# Patient Record
Sex: FEMALE | Race: BLACK OR AFRICAN AMERICAN
[De-identification: names, ages, dates, MRNs, and addresses within clinical notes are randomized per-mention and may not be internally consistent; named-entity substitution may affect disease eponyms.]

---

## 2019-03-05 ENCOUNTER — HOSPITAL ENCOUNTER (INPATIENT)
Dept: HOSPITAL 91 - REC | Age: 54
LOS: 3 days | Discharge: HOME | DRG: 743 | End: 2019-03-08
Payer: COMMERCIAL

## 2019-03-05 ENCOUNTER — HOSPITAL ENCOUNTER (INPATIENT)
Dept: HOSPITAL 10 - REC | Age: 54
LOS: 3 days | Discharge: HOME | DRG: 743 | End: 2019-03-08
Attending: OBSTETRICS & GYNECOLOGY | Admitting: OBSTETRICS & GYNECOLOGY
Payer: COMMERCIAL

## 2019-03-05 VITALS — SYSTOLIC BLOOD PRESSURE: 128 MMHG | DIASTOLIC BLOOD PRESSURE: 67 MMHG | HEART RATE: 70 BPM | RESPIRATION RATE: 11 BRPM

## 2019-03-05 VITALS — RESPIRATION RATE: 16 BRPM | DIASTOLIC BLOOD PRESSURE: 69 MMHG | HEART RATE: 89 BPM | SYSTOLIC BLOOD PRESSURE: 124 MMHG

## 2019-03-05 VITALS — SYSTOLIC BLOOD PRESSURE: 123 MMHG | RESPIRATION RATE: 12 BRPM | HEART RATE: 72 BPM | DIASTOLIC BLOOD PRESSURE: 64 MMHG

## 2019-03-05 VITALS — RESPIRATION RATE: 14 BRPM | SYSTOLIC BLOOD PRESSURE: 128 MMHG | DIASTOLIC BLOOD PRESSURE: 66 MMHG | HEART RATE: 72 BPM

## 2019-03-05 VITALS — SYSTOLIC BLOOD PRESSURE: 122 MMHG | DIASTOLIC BLOOD PRESSURE: 64 MMHG | HEART RATE: 77 BPM

## 2019-03-05 VITALS — DIASTOLIC BLOOD PRESSURE: 72 MMHG | SYSTOLIC BLOOD PRESSURE: 122 MMHG | HEART RATE: 68 BPM

## 2019-03-05 VITALS — HEART RATE: 78 BPM | DIASTOLIC BLOOD PRESSURE: 74 MMHG | SYSTOLIC BLOOD PRESSURE: 127 MMHG

## 2019-03-05 VITALS — HEART RATE: 70 BPM | SYSTOLIC BLOOD PRESSURE: 132 MMHG | DIASTOLIC BLOOD PRESSURE: 66 MMHG | RESPIRATION RATE: 12 BRPM

## 2019-03-05 VITALS — SYSTOLIC BLOOD PRESSURE: 121 MMHG | RESPIRATION RATE: 20 BRPM | HEART RATE: 74 BPM | DIASTOLIC BLOOD PRESSURE: 60 MMHG

## 2019-03-05 VITALS — DIASTOLIC BLOOD PRESSURE: 80 MMHG | HEART RATE: 82 BPM | SYSTOLIC BLOOD PRESSURE: 136 MMHG

## 2019-03-05 VITALS — SYSTOLIC BLOOD PRESSURE: 137 MMHG | RESPIRATION RATE: 18 BRPM | HEART RATE: 76 BPM | DIASTOLIC BLOOD PRESSURE: 78 MMHG

## 2019-03-05 VITALS — HEART RATE: 72 BPM | SYSTOLIC BLOOD PRESSURE: 127 MMHG | RESPIRATION RATE: 14 BRPM | DIASTOLIC BLOOD PRESSURE: 63 MMHG

## 2019-03-05 VITALS — DIASTOLIC BLOOD PRESSURE: 70 MMHG | HEART RATE: 64 BPM | SYSTOLIC BLOOD PRESSURE: 127 MMHG

## 2019-03-05 VITALS — SYSTOLIC BLOOD PRESSURE: 119 MMHG | HEART RATE: 74 BPM | DIASTOLIC BLOOD PRESSURE: 61 MMHG | RESPIRATION RATE: 13 BRPM

## 2019-03-05 VITALS
BODY MASS INDEX: 24.01 KG/M2 | BODY MASS INDEX: 24.01 KG/M2 | HEIGHT: 64 IN | WEIGHT: 140.65 LBS | BODY MASS INDEX: 24.01 KG/M2 | WEIGHT: 140.65 LBS | HEIGHT: 64 IN

## 2019-03-05 VITALS — RESPIRATION RATE: 18 BRPM | DIASTOLIC BLOOD PRESSURE: 81 MMHG | SYSTOLIC BLOOD PRESSURE: 140 MMHG | HEART RATE: 81 BPM

## 2019-03-05 VITALS — HEART RATE: 74 BPM | DIASTOLIC BLOOD PRESSURE: 69 MMHG | RESPIRATION RATE: 12 BRPM | SYSTOLIC BLOOD PRESSURE: 127 MMHG

## 2019-03-05 VITALS — RESPIRATION RATE: 16 BRPM | SYSTOLIC BLOOD PRESSURE: 120 MMHG | HEART RATE: 82 BPM | DIASTOLIC BLOOD PRESSURE: 60 MMHG

## 2019-03-05 VITALS — DIASTOLIC BLOOD PRESSURE: 65 MMHG | SYSTOLIC BLOOD PRESSURE: 126 MMHG | HEART RATE: 72 BPM | RESPIRATION RATE: 13 BRPM

## 2019-03-05 VITALS — DIASTOLIC BLOOD PRESSURE: 61 MMHG | RESPIRATION RATE: 13 BRPM | SYSTOLIC BLOOD PRESSURE: 122 MMHG | HEART RATE: 78 BPM

## 2019-03-05 VITALS — HEART RATE: 74 BPM | RESPIRATION RATE: 14 BRPM | SYSTOLIC BLOOD PRESSURE: 119 MMHG | DIASTOLIC BLOOD PRESSURE: 61 MMHG

## 2019-03-05 VITALS — DIASTOLIC BLOOD PRESSURE: 62 MMHG | RESPIRATION RATE: 19 BRPM | SYSTOLIC BLOOD PRESSURE: 121 MMHG | HEART RATE: 72 BPM

## 2019-03-05 VITALS — DIASTOLIC BLOOD PRESSURE: 77 MMHG | SYSTOLIC BLOOD PRESSURE: 126 MMHG | HEART RATE: 68 BPM

## 2019-03-05 VITALS — SYSTOLIC BLOOD PRESSURE: 121 MMHG | HEART RATE: 72 BPM | DIASTOLIC BLOOD PRESSURE: 68 MMHG

## 2019-03-05 VITALS — SYSTOLIC BLOOD PRESSURE: 126 MMHG | HEART RATE: 74 BPM | RESPIRATION RATE: 12 BRPM | DIASTOLIC BLOOD PRESSURE: 65 MMHG

## 2019-03-05 VITALS — HEART RATE: 88 BPM | SYSTOLIC BLOOD PRESSURE: 122 MMHG | RESPIRATION RATE: 17 BRPM | DIASTOLIC BLOOD PRESSURE: 54 MMHG

## 2019-03-05 VITALS — HEART RATE: 75 BPM | SYSTOLIC BLOOD PRESSURE: 132 MMHG | DIASTOLIC BLOOD PRESSURE: 72 MMHG

## 2019-03-05 VITALS — RESPIRATION RATE: 14 BRPM | SYSTOLIC BLOOD PRESSURE: 121 MMHG | HEART RATE: 74 BPM | DIASTOLIC BLOOD PRESSURE: 61 MMHG

## 2019-03-05 DIAGNOSIS — I25.119: ICD-10-CM

## 2019-03-05 DIAGNOSIS — N73.6: ICD-10-CM

## 2019-03-05 DIAGNOSIS — F41.9: ICD-10-CM

## 2019-03-05 DIAGNOSIS — D25.1: Primary | ICD-10-CM

## 2019-03-05 DIAGNOSIS — I10: ICD-10-CM

## 2019-03-05 DIAGNOSIS — D25.2: ICD-10-CM

## 2019-03-05 DIAGNOSIS — F32.9: ICD-10-CM

## 2019-03-05 PROCEDURE — 0UT90ZL RESECTION OF UTERUS, SUPRACERVICAL, OPEN APPROACH: ICD-10-PCS | Performed by: OBSTETRICS & GYNECOLOGY

## 2019-03-05 PROCEDURE — 0UN90ZZ RELEASE UTERUS, OPEN APPROACH: ICD-10-PCS | Performed by: OBSTETRICS & GYNECOLOGY

## 2019-03-05 PROCEDURE — 0UN20ZZ RELEASE BILATERAL OVARIES, OPEN APPROACH: ICD-10-PCS | Performed by: OBSTETRICS & GYNECOLOGY

## 2019-03-05 PROCEDURE — 87086 URINE CULTURE/COLONY COUNT: CPT

## 2019-03-05 PROCEDURE — 0UT70ZZ RESECTION OF BILATERAL FALLOPIAN TUBES, OPEN APPROACH: ICD-10-PCS | Performed by: OBSTETRICS & GYNECOLOGY

## 2019-03-05 PROCEDURE — 80053 COMPREHEN METABOLIC PANEL: CPT

## 2019-03-05 PROCEDURE — 0UT20ZZ RESECTION OF BILATERAL OVARIES, OPEN APPROACH: ICD-10-PCS | Performed by: OBSTETRICS & GYNECOLOGY

## 2019-03-05 PROCEDURE — 86850 RBC ANTIBODY SCREEN: CPT

## 2019-03-05 PROCEDURE — 88305 TISSUE EXAM BY PATHOLOGIST: CPT

## 2019-03-05 PROCEDURE — 85025 COMPLETE CBC W/AUTO DIFF WBC: CPT

## 2019-03-05 PROCEDURE — 0UN90ZZ RELEASE UTERUS, OPEN APPROACH: ICD-10-PCS

## 2019-03-05 PROCEDURE — 81001 URINALYSIS AUTO W/SCOPE: CPT

## 2019-03-05 PROCEDURE — 86920 COMPATIBILITY TEST SPIN: CPT

## 2019-03-05 PROCEDURE — 0UT90ZL RESECTION OF UTERUS, SUPRACERVICAL, OPEN APPROACH: ICD-10-PCS

## 2019-03-05 PROCEDURE — 86901 BLOOD TYPING SEROLOGIC RH(D): CPT

## 2019-03-05 PROCEDURE — 86900 BLOOD TYPING SEROLOGIC ABO: CPT

## 2019-03-05 PROCEDURE — 0UT70ZZ RESECTION OF BILATERAL FALLOPIAN TUBES, OPEN APPROACH: ICD-10-PCS

## 2019-03-05 PROCEDURE — 0UT20ZZ RESECTION OF BILATERAL OVARIES, OPEN APPROACH: ICD-10-PCS

## 2019-03-05 PROCEDURE — 0UN70ZZ RELEASE BILATERAL FALLOPIAN TUBES, OPEN APPROACH: ICD-10-PCS

## 2019-03-05 PROCEDURE — 0UN70ZZ RELEASE BILATERAL FALLOPIAN TUBES, OPEN APPROACH: ICD-10-PCS | Performed by: OBSTETRICS & GYNECOLOGY

## 2019-03-05 PROCEDURE — 0UN20ZZ RELEASE BILATERAL OVARIES, OPEN APPROACH: ICD-10-PCS

## 2019-03-05 RX ADMIN — PYRIDOXINE HYDROCHLORIDE SCH MLS/HR: 100 INJECTION, SOLUTION INTRAMUSCULAR; INTRAVENOUS at 11:32

## 2019-03-05 RX ADMIN — PYRIDOXINE HYDROCHLORIDE 1 MLS/HR: 100 INJECTION, SOLUTION INTRAMUSCULAR; INTRAVENOUS at 15:00

## 2019-03-05 RX ADMIN — ONDANSETRON HYDROCHLORIDE 1 MG: 2 INJECTION, SOLUTION INTRAMUSCULAR; INTRAVENOUS at 20:47

## 2019-03-05 RX ADMIN — KETOROLAC TROMETHAMINE PRN MG: 30 INJECTION, SOLUTION INTRAMUSCULAR at 20:47

## 2019-03-05 RX ADMIN — KETOROLAC TROMETHAMINE PRN MG: 30 INJECTION, SOLUTION INTRAMUSCULAR at 11:36

## 2019-03-05 RX ADMIN — KETOROLAC TROMETHAMINE 1 MG: 30 INJECTION, SOLUTION INTRAMUSCULAR at 20:47

## 2019-03-05 RX ADMIN — PYRIDOXINE HYDROCHLORIDE 1 MLS/HR: 100 INJECTION, SOLUTION INTRAMUSCULAR; INTRAVENOUS at 11:32

## 2019-03-05 RX ADMIN — CEFAZOLIN SODIUM 1 MLS/HR: 2 SOLUTION INTRAVENOUS at 12:42

## 2019-03-05 RX ADMIN — KETOROLAC TROMETHAMINE 1 MG: 30 INJECTION, SOLUTION INTRAMUSCULAR at 11:36

## 2019-03-05 RX ADMIN — DEXAMETHASONE SODIUM PHOSPHATE PRN MG: 10 INJECTION, SOLUTION INTRAMUSCULAR; INTRAVENOUS at 20:47

## 2019-03-05 RX ADMIN — PYRIDOXINE HYDROCHLORIDE SCH MLS/HR: 100 INJECTION, SOLUTION INTRAMUSCULAR; INTRAVENOUS at 15:00

## 2019-03-05 RX ADMIN — MORPHINE SULFATE 1 MG: 2 INJECTION, SOLUTION INTRAMUSCULAR; INTRAVENOUS at 18:40

## 2019-03-05 RX ADMIN — DEXAMETHASONE SODIUM PHOSPHATE PRN MG: 10 INJECTION, SOLUTION INTRAMUSCULAR; INTRAVENOUS at 12:32

## 2019-03-05 RX ADMIN — ONDANSETRON HYDROCHLORIDE 1 MG: 2 INJECTION, SOLUTION INTRAMUSCULAR; INTRAVENOUS at 12:32

## 2019-03-05 NOTE — PREAC
Date/Time of Note


Date/Time of Note


DATE: 3/5/19 


TIME: 07:37





Anesthesia Eval and Record


Evaluation


Time Pre-Procedure Interview


DATE: 3/5/19 


TIME: 07:37


Age


53


Sex


female


NPO:  8 hrs


Preoperative diagnosis


uterine fibroids


Planned procedure


abdominal suracervical hysterectomy





Past Medical History


Past Medical History:  Includes


Cardio:  HTN, CAD (angina)


Psych:  Depression, Anxiety





Surgery & Anesthesia Issues


No known issue





Meds


Anticoagulation:  No


Beta Blocker within 24 hr:  No


Reason Beta Blocker not given:  Pt. not on B-Blocker


Reported Medications


Amlodipine Besylate* (Norvasc*) 2.5 Mg Tablet, 2.5 MG PO DAILY


   1/5/15





Current Medications


Lactated Ringer's 1,000 ml @  125 mls/hr Q8H IV* ;  Start 3/5/19 at 07:00


Meds reviewed:  Yes





Allergies


Coded Allergies:  


     guaifenesin (Verified  Allergy, Intermediate, FACIAL SWELLING, 3/5/19)


     doxycycline (Verified  Allergy, Mild, RASH, 3/5/19)


Allergies Reviewed:  Yes





Labs/Studies


Labs Reviewed:  Reviewed by anesthesiologist





Blood Bank


                      Test
                  3/5/19
06:02


                      Blood Product Summary  Counts


                      Blood Type             B POSITIVE





Pregnancy test:  Negative


Studies:  ECG (nl), CXR (nl)





Pre-procedure Exam


Last vitals





Vital Signs


  Date      Temp  Pulse  Resp  B/P (MAP)   Pulse Ox  O2          O2 Flow    FiO2


Time                                                 Delivery    Rate


    3/5/19  98.8     76    18      137/78       100  Room Air


     06:39                           (97)





Airway:  Adequate mouth opening, Adequate thyromental dist


Mallampati:  Mallampati II


Teeth:  Normal


Lung:  Normal


Heart:  Normal





ASA Physical Status


ASA physical status:  3


Emergency:  None





Planned Anesthetic


General/MAC:  ETT


Neuraxial:  Spinal





Planned Pain Management


Sub-arachniod narcotics, Parenteral pain med





Pre-operative Attestations


Prior to commencing anesthesia and surgery, the patient was re-evaluated, there 


was verification of:


*The patient's identity


*The results of appropriate recent lab work and preoperative vital signs


*The above evaluation not changing prior to induction


*Anesthetic plan, risk benefits, alternative and complications discussed with 


patient/family; questions answered; patient/family understands, accepts and 


wishes to proceed.











Fredis Miller M.D.       Mar 5, 2019 07:39

## 2019-03-05 NOTE — PAC
Date/Time of Note


Date/Time of Note


DATE: 3/5/19 


TIME: 10:57





Post-Anesthesia Notes


Post-Anesthesia Note


Last documented vital signs





Vital Signs


  Date      Temp  Pulse  Resp  B/P (MAP)   Pulse Ox  O2          O2 Flow    FiO2


Time                                                 Delivery    Rate


    3/5/19           72    14      127/63        97  Room Air


     10:41                           (84)


    3/5/19  98.2


     10:08





Activity:  WNL


Respiratory function:  WNL


Cardiovascular function:  WNL


Mental status:  Baseline


Pain reasonably controlled:  Yes


Hydration appropriate:  Yes


Nausea/Vomiting absent:  Yes











Fredis Miller M.D.       Mar 5, 2019 10:57

## 2019-03-05 NOTE — HPN
Date/Time of Note


Date/Time of Note


DATE: 3/5/19 


TIME: 07:17





Interval H&P Admission Note


Pt. seen H&P reviewed:  No system changes











STEWART AVILA MD                 Mar 5, 2019 07:17

## 2019-03-05 NOTE — SIPON
Date/Time of Note


Date/Time of Note


DATE: 3/5/19 


TIME: 09:55





Operative Report


Preoperative Diagnosis


uterine leiomyomata


hx of multiple myomectomy


Postoperative Diagnosis


same as above


severe pelvic adhesion


Operation/Procedure Performed


lysis of adhesion


subtotal hysterectomy


bilateral salphingo oophorectomy


Surgeon


see signature line


First assist


reiche


Anesthesia:  general, spinal


Estimated blood loss:  100 - 150 ml's


Transfusion Required


   none


Specimen


uterus


both  ovaries and tubes


Grafts/Implants


none


Complications


none











STEWART AVILA MD                 Mar 5, 2019 09:59

## 2019-03-06 VITALS — DIASTOLIC BLOOD PRESSURE: 72 MMHG | RESPIRATION RATE: 17 BRPM | SYSTOLIC BLOOD PRESSURE: 133 MMHG | HEART RATE: 77 BPM

## 2019-03-06 VITALS — RESPIRATION RATE: 18 BRPM | HEART RATE: 75 BPM | DIASTOLIC BLOOD PRESSURE: 66 MMHG | SYSTOLIC BLOOD PRESSURE: 142 MMHG

## 2019-03-06 VITALS — SYSTOLIC BLOOD PRESSURE: 123 MMHG | HEART RATE: 81 BPM | DIASTOLIC BLOOD PRESSURE: 70 MMHG | RESPIRATION RATE: 18 BRPM

## 2019-03-06 VITALS — SYSTOLIC BLOOD PRESSURE: 153 MMHG | HEART RATE: 74 BPM | RESPIRATION RATE: 18 BRPM | DIASTOLIC BLOOD PRESSURE: 79 MMHG

## 2019-03-06 VITALS — DIASTOLIC BLOOD PRESSURE: 79 MMHG | SYSTOLIC BLOOD PRESSURE: 142 MMHG | RESPIRATION RATE: 20 BRPM | HEART RATE: 74 BPM

## 2019-03-06 LAB
ADD MAN DIFF?: NO
ALANINE AMINOTRANSFERASE: 8 IU/L (ref 13–69)
ALBUMIN/GLOBULIN RATIO: 1.06
ALBUMIN: 3.5 G/DL (ref 3.3–4.9)
ALKALINE PHOSPHATASE: 50 IU/L (ref 42–121)
ANION GAP: 6 (ref 5–13)
ASPARTATE AMINO TRANSFERASE: 20 IU/L (ref 15–46)
BASOPHIL #: 0 10^3/UL (ref 0–0.1)
BASOPHILS %: 0.2 % (ref 0–2)
BILIRUBIN,DIRECT: 0 MG/DL (ref 0–0.2)
BILIRUBIN,TOTAL: 0.7 MG/DL (ref 0.2–1.3)
BLOOD UREA NITROGEN: 8 MG/DL (ref 7–20)
CALCIUM: 8.7 MG/DL (ref 8.4–10.2)
CARBON DIOXIDE: 28 MMOL/L (ref 21–31)
CHLORIDE: 105 MMOL/L (ref 97–110)
CREATININE: 0.81 MG/DL (ref 0.44–1)
EOSINOPHILS #: 0 10^3/UL (ref 0–0.5)
EOSINOPHILS %: 0 % (ref 0–7)
GLOBULIN: 3.3 G/DL (ref 1.3–3.2)
GLUCOSE: 116 MG/DL (ref 70–220)
HEMATOCRIT: 32.3 % (ref 37–47)
HEMOGLOBIN: 10.8 G/DL (ref 12–16)
IMMATURE GRANS #M: 0.07 10^3/UL (ref 0–0.03)
IMMATURE GRANS % (M): 0.6 % (ref 0–0.43)
LYMPHOCYTES #: 1 10^3/UL (ref 0.8–2.9)
LYMPHOCYTES %: 8.9 % (ref 15–51)
MEAN CORPUSCULAR HEMOGLOBIN: 26.5 PG (ref 29–33)
MEAN CORPUSCULAR HGB CONC: 33.4 G/DL (ref 32–37)
MEAN CORPUSCULAR VOLUME: 79.2 FL (ref 82–101)
MEAN PLATELET VOLUME: 11.7 FL (ref 7.4–10.4)
MONOCYTE #: 1.1 10^3/UL (ref 0.3–0.9)
MONOCYTES %: 9.5 % (ref 0–11)
NEUTROPHIL #: 9.3 10^3/UL (ref 1.6–7.5)
NEUTROPHILS %: 80.8 % (ref 39–77)
NUCLEATED RED BLOOD CELLS #: 0 10^3/UL (ref 0–0)
NUCLEATED RED BLOOD CELLS%: 0 /100WBC (ref 0–0)
PLATELET COUNT: 200 10^3/UL (ref 140–415)
POTASSIUM: 3.9 MMOL/L (ref 3.5–5.1)
RED BLOOD COUNT: 4.08 10^6/UL (ref 4.2–5.4)
RED CELL DISTRIBUTION WIDTH: 14.6 % (ref 11.5–14.5)
SODIUM: 139 MMOL/L (ref 135–144)
TOTAL PROTEIN: 6.8 G/DL (ref 6.1–8.1)
WHITE BLOOD COUNT: 11.5 10^3/UL (ref 4.8–10.8)

## 2019-03-06 RX ADMIN — ONDANSETRON HYDROCHLORIDE 1 MG: 2 INJECTION, SOLUTION INTRAMUSCULAR; INTRAVENOUS at 07:33

## 2019-03-06 RX ADMIN — PYRIDOXINE HYDROCHLORIDE SCH MLS/HR: 100 INJECTION, SOLUTION INTRAMUSCULAR; INTRAVENOUS at 02:41

## 2019-03-06 RX ADMIN — HYDROCODONE BITARTRATE AND ACETAMINOPHEN 1 TAB: 5; 325 TABLET ORAL at 15:50

## 2019-03-06 RX ADMIN — PYRIDOXINE HYDROCHLORIDE 1 MLS/HR: 100 INJECTION, SOLUTION INTRAMUSCULAR; INTRAVENOUS at 02:41

## 2019-03-06 RX ADMIN — PYRIDOXINE HYDROCHLORIDE 1 MLS/HR: 100 INJECTION, SOLUTION INTRAMUSCULAR; INTRAVENOUS at 07:00

## 2019-03-06 RX ADMIN — IBUPROFEN 1 MG: 800 TABLET, FILM COATED ORAL at 14:07

## 2019-03-06 RX ADMIN — PYRIDOXINE HYDROCHLORIDE SCH MLS/HR: 100 INJECTION, SOLUTION INTRAMUSCULAR; INTRAVENOUS at 07:00

## 2019-03-06 RX ADMIN — PYRIDOXINE HYDROCHLORIDE SCH MLS/HR: 100 INJECTION, SOLUTION INTRAMUSCULAR; INTRAVENOUS at 11:34

## 2019-03-06 RX ADMIN — IBUPROFEN SCH MG: 800 TABLET ORAL at 22:00

## 2019-03-06 RX ADMIN — HYDROCODONE BITARTRATE AND ACETAMINOPHEN PRN TAB: 5; 325 TABLET ORAL at 15:50

## 2019-03-06 RX ADMIN — HYDROCODONE BITARTRATE AND ACETAMINOPHEN PRN TAB: 5; 325 TABLET ORAL at 19:42

## 2019-03-06 RX ADMIN — IBUPROFEN 1 MG: 800 TABLET, FILM COATED ORAL at 22:00

## 2019-03-06 RX ADMIN — MORPHINE SULFATE 1 MG: 2 INJECTION, SOLUTION INTRAMUSCULAR; INTRAVENOUS at 07:33

## 2019-03-06 RX ADMIN — DEXAMETHASONE SODIUM PHOSPHATE PRN MG: 10 INJECTION, SOLUTION INTRAMUSCULAR; INTRAVENOUS at 07:33

## 2019-03-06 RX ADMIN — PYRIDOXINE HYDROCHLORIDE 1 MLS/HR: 100 INJECTION, SOLUTION INTRAMUSCULAR; INTRAVENOUS at 11:34

## 2019-03-06 RX ADMIN — MORPHINE SULFATE 1 MG: 2 INJECTION, SOLUTION INTRAMUSCULAR; INTRAVENOUS at 00:23

## 2019-03-06 RX ADMIN — MORPHINE SULFATE PRN MG: 2 INJECTION, SOLUTION INTRAMUSCULAR; INTRAVENOUS at 07:33

## 2019-03-06 RX ADMIN — HYDROCODONE BITARTRATE AND ACETAMINOPHEN 1 TAB: 5; 325 TABLET ORAL at 19:42

## 2019-03-06 RX ADMIN — PYRIDOXINE HYDROCHLORIDE 1 MLS/HR: 100 INJECTION, SOLUTION INTRAMUSCULAR; INTRAVENOUS at 18:53

## 2019-03-06 RX ADMIN — PYRIDOXINE HYDROCHLORIDE SCH MLS/HR: 100 INJECTION, SOLUTION INTRAMUSCULAR; INTRAVENOUS at 18:53

## 2019-03-06 RX ADMIN — MORPHINE SULFATE PRN MG: 2 INJECTION, SOLUTION INTRAMUSCULAR; INTRAVENOUS at 00:23

## 2019-03-06 RX ADMIN — IBUPROFEN SCH MG: 800 TABLET ORAL at 14:07

## 2019-03-06 NOTE — QN
Documentation


Comment


no flatus


not ambulating


max temp 99.5 normotensive


abdomen soft  wound   dry


calf neg for tenderness


A   strable  POD #1


P as ordered











STEWART AVILA MD                 Mar 6, 2019 20:42

## 2019-03-07 VITALS — HEART RATE: 64 BPM | SYSTOLIC BLOOD PRESSURE: 119 MMHG | DIASTOLIC BLOOD PRESSURE: 74 MMHG | RESPIRATION RATE: 18 BRPM

## 2019-03-07 VITALS — HEART RATE: 64 BPM | RESPIRATION RATE: 17 BRPM | DIASTOLIC BLOOD PRESSURE: 81 MMHG | SYSTOLIC BLOOD PRESSURE: 140 MMHG

## 2019-03-07 VITALS — HEART RATE: 61 BPM | SYSTOLIC BLOOD PRESSURE: 128 MMHG | RESPIRATION RATE: 20 BRPM | DIASTOLIC BLOOD PRESSURE: 65 MMHG

## 2019-03-07 VITALS — DIASTOLIC BLOOD PRESSURE: 94 MMHG | HEART RATE: 66 BPM | SYSTOLIC BLOOD PRESSURE: 164 MMHG | RESPIRATION RATE: 20 BRPM

## 2019-03-07 LAB
ADD UMIC: YES
UR ASCORBIC ACID: NEGATIVE MG/DL
UR BILIRUBIN (DIP): NEGATIVE MG/DL
UR BLOOD (DIP): NEGATIVE MG/DL
UR CLARITY: CLEAR
UR COLOR: (no result)
UR GLUCOSE (DIP): NEGATIVE MG/DL
UR KETONES (DIP): (no result) MG/DL
UR LEUKOCYTE ESTERASE (DIP): (no result) LEU/UL
UR NITRITE (DIP): NEGATIVE MG/DL
UR PH (DIP): 8 (ref 5–9)
UR RBC: 1 /HPF (ref 0–5)
UR SPECIFIC GRAVITY (DIP): 1 (ref 1–1.03)
UR TOTAL PROTEIN (DIP): NEGATIVE MG/DL
UR UROBILINOGEN (DIP): NEGATIVE MG/DL
UR WBC: 1 /HPF (ref 0–5)

## 2019-03-07 RX ADMIN — IBUPROFEN SCH MG: 800 TABLET ORAL at 13:58

## 2019-03-07 RX ADMIN — IBUPROFEN 1 MG: 800 TABLET, FILM COATED ORAL at 06:12

## 2019-03-07 RX ADMIN — IBUPROFEN SCH MG: 800 TABLET ORAL at 22:04

## 2019-03-07 RX ADMIN — HYDROCODONE BITARTRATE AND ACETAMINOPHEN PRN TAB: 5; 325 TABLET ORAL at 05:25

## 2019-03-07 RX ADMIN — HYDROCODONE BITARTRATE AND ACETAMINOPHEN 1 TAB: 5; 325 TABLET ORAL at 09:34

## 2019-03-07 RX ADMIN — PYRIDOXINE HYDROCHLORIDE SCH MLS/HR: 100 INJECTION, SOLUTION INTRAMUSCULAR; INTRAVENOUS at 13:59

## 2019-03-07 RX ADMIN — HYDROCODONE BITARTRATE AND ACETAMINOPHEN 1 TAB: 5; 325 TABLET ORAL at 19:09

## 2019-03-07 RX ADMIN — HYDROCODONE BITARTRATE AND ACETAMINOPHEN PRN TAB: 5; 325 TABLET ORAL at 19:09

## 2019-03-07 RX ADMIN — PYRIDOXINE HYDROCHLORIDE 1 MLS/HR: 100 INJECTION, SOLUTION INTRAMUSCULAR; INTRAVENOUS at 22:05

## 2019-03-07 RX ADMIN — HYDROCODONE BITARTRATE AND ACETAMINOPHEN PRN TAB: 5; 325 TABLET ORAL at 09:34

## 2019-03-07 RX ADMIN — IBUPROFEN 1 MG: 800 TABLET, FILM COATED ORAL at 22:04

## 2019-03-07 RX ADMIN — PYRIDOXINE HYDROCHLORIDE SCH MLS/HR: 100 INJECTION, SOLUTION INTRAMUSCULAR; INTRAVENOUS at 22:05

## 2019-03-07 RX ADMIN — HYDROCODONE BITARTRATE AND ACETAMINOPHEN 1 TAB: 5; 325 TABLET ORAL at 05:25

## 2019-03-07 RX ADMIN — PYRIDOXINE HYDROCHLORIDE 1 MLS/HR: 100 INJECTION, SOLUTION INTRAMUSCULAR; INTRAVENOUS at 13:59

## 2019-03-07 RX ADMIN — PYRIDOXINE HYDROCHLORIDE 1 MLS/HR: 100 INJECTION, SOLUTION INTRAMUSCULAR; INTRAVENOUS at 03:41

## 2019-03-07 RX ADMIN — IBUPROFEN SCH MG: 800 TABLET ORAL at 06:12

## 2019-03-07 RX ADMIN — HYDROCODONE BITARTRATE AND ACETAMINOPHEN PRN TAB: 5; 325 TABLET ORAL at 00:52

## 2019-03-07 RX ADMIN — HYDROCODONE BITARTRATE AND ACETAMINOPHEN 1 TAB: 5; 325 TABLET ORAL at 00:52

## 2019-03-07 RX ADMIN — PYRIDOXINE HYDROCHLORIDE SCH MLS/HR: 100 INJECTION, SOLUTION INTRAMUSCULAR; INTRAVENOUS at 03:41

## 2019-03-07 RX ADMIN — IBUPROFEN 1 MG: 800 TABLET, FILM COATED ORAL at 13:58

## 2019-03-07 NOTE — QN
Documentation


Comment


no flatus  yet  pain is better controlled


VSS   afebrile 


abdomen soft   wound   dry


CVA   neg for tenderness


calf neg for tenderness


A stable post subtotal hyst anad BSO


P  discharge home  tomorrow pm











STEWART AVILA MD                 Mar 7, 2019 20:38

## 2019-03-08 VITALS — HEART RATE: 65 BPM | RESPIRATION RATE: 20 BRPM | SYSTOLIC BLOOD PRESSURE: 127 MMHG | DIASTOLIC BLOOD PRESSURE: 65 MMHG

## 2019-03-08 VITALS — DIASTOLIC BLOOD PRESSURE: 72 MMHG | RESPIRATION RATE: 18 BRPM | SYSTOLIC BLOOD PRESSURE: 151 MMHG | HEART RATE: 70 BPM

## 2019-03-08 VITALS — HEART RATE: 66 BPM | DIASTOLIC BLOOD PRESSURE: 71 MMHG | RESPIRATION RATE: 18 BRPM | SYSTOLIC BLOOD PRESSURE: 130 MMHG

## 2019-03-08 RX ADMIN — HYDROCODONE BITARTRATE AND ACETAMINOPHEN PRN TAB: 5; 325 TABLET ORAL at 18:01

## 2019-03-08 RX ADMIN — HYDROCODONE BITARTRATE AND ACETAMINOPHEN PRN TAB: 5; 325 TABLET ORAL at 13:46

## 2019-03-08 RX ADMIN — HYDROCODONE BITARTRATE AND ACETAMINOPHEN 1 TAB: 5; 325 TABLET ORAL at 05:46

## 2019-03-08 RX ADMIN — PYRIDOXINE HYDROCHLORIDE SCH MLS/HR: 100 INJECTION, SOLUTION INTRAMUSCULAR; INTRAVENOUS at 05:47

## 2019-03-08 RX ADMIN — IBUPROFEN 1 MG: 800 TABLET, FILM COATED ORAL at 14:55

## 2019-03-08 RX ADMIN — HYDROCODONE BITARTRATE AND ACETAMINOPHEN 1 TAB: 5; 325 TABLET ORAL at 13:46

## 2019-03-08 RX ADMIN — IBUPROFEN SCH MG: 800 TABLET ORAL at 06:52

## 2019-03-08 RX ADMIN — IBUPROFEN SCH MG: 800 TABLET ORAL at 14:55

## 2019-03-08 RX ADMIN — HYDROCODONE BITARTRATE AND ACETAMINOPHEN PRN TAB: 5; 325 TABLET ORAL at 09:43

## 2019-03-08 RX ADMIN — HYDROCODONE BITARTRATE AND ACETAMINOPHEN 1 TAB: 5; 325 TABLET ORAL at 09:43

## 2019-03-08 RX ADMIN — PYRIDOXINE HYDROCHLORIDE 1 MLS/HR: 100 INJECTION, SOLUTION INTRAMUSCULAR; INTRAVENOUS at 05:47

## 2019-03-08 RX ADMIN — IBUPROFEN 1 MG: 800 TABLET, FILM COATED ORAL at 06:52

## 2019-03-08 RX ADMIN — PYRIDOXINE HYDROCHLORIDE SCH MLS/HR: 100 INJECTION, SOLUTION INTRAMUSCULAR; INTRAVENOUS at 13:46

## 2019-03-08 RX ADMIN — HYDROCODONE BITARTRATE AND ACETAMINOPHEN PRN TAB: 5; 325 TABLET ORAL at 05:46

## 2019-03-08 RX ADMIN — PYRIDOXINE HYDROCHLORIDE 1 MLS/HR: 100 INJECTION, SOLUTION INTRAMUSCULAR; INTRAVENOUS at 13:46

## 2019-03-08 RX ADMIN — HYDROCODONE BITARTRATE AND ACETAMINOPHEN 1 TAB: 5; 325 TABLET ORAL at 18:01

## 2019-03-08 NOTE — DS
Date/Time of Note


Date/Time of Note


DATE: 3/8/19 


TIME: 15:31





Discharge Summary


Admission/Discharge Info


Admit Date/Time


Mar 5, 2019 at 06:04


Discharge Date/Time


mar 8th,2019 at 1700


Discharge Diagnosis


uterine fibroid and pelvic adhesion


Patient Condition:  Stable


Consults


n/a


Procedures


subtotal  hysterectomy and bilateral salphingooophorectomy


Hx of Present Illness


54y.o  nulligravida underwent  subtotal hyst and  BSO on 03/05/19 


postoperatively  had unevenful course 


no bowel movement yet


pain control adequate  with motrin and  norco 


tolerating diet well and ambulating ok


f/u in 2weeks  at office


Hospital Course


same as above


Home Meds


Reported Medications


Amlodipine Besylate* (Norvasc*) 2.5 Mg Tablet, 2.5 MG PO DAILY


   1/5/15


Follow-up Plan


2weeks


Primary Care Provider


Not On Staff Doctor


Time spent on discharge:  < 30 minutes


Pending Labs





Laboratory Tests


              Test
                                   3/7/19
17:20


              Urine Color                STRAW (YELLOW)


              Urine Clarity              CLEAR (CLEAR)


              Urine pH                              8.0 (5.0-9.0)


              Urine Specific Gravity
          1.005
(1.003-1.030)


              Urine Ketones
             TRACE mg/dL
(NEGATIVE)


              Urine Nitrite
             NEGATIVE mg/dL
(NEGATIVE)


              Urine Bilirubin
           NEGATIVE mg/dL
(NEGATIVE)


              Urine Urobilinogen
        NEGATIVE mg/dL
(NEGATIVE)


              Urine Leukocyte Esterase
  TRACE Ana/ul
(NEGATIVE)


              Urine Microscopic RBC                  1 /HPF (0-5)


              Urine Microscopic WBC                  1 /HPF (0-5)


              Urine Hemoglobin
          NEGATIVE mg/dL
(NEGATIVE)


              Urine Glucose
             NEGATIVE mg/dL
(NEGATIVE)


              Urine Total Protein
       NEGATIVE mg/dl
(NEGATIVE)





Microbiology


 Date/Time
Source             Procedure
Growth                          Status



 3/7/19 17:20
Zapata Catheter  Urine Culture - Preliminary
NO GROWTH     Resulted


                              AFTER 24 HOURS














STEWART AVILA MD                 Mar 8, 2019 15:37

## 2019-03-08 NOTE — OPR
DATE OF OPERATION:  03/05/2019

 

 

PREOPERATIVE DIAGNOSES:

1.  Uterine leiomyomata.

2.  History of multiple myomectomy.

 

POSTOPERATIVE DIAGNOSES:

1.  Uterine leiomyomata.

2.  History of multiple myomectomy.

3.  Severe pelvic adhesion.

 

NAME OF OPERATIONS:  Lysis of adhesions, subtotal hysterectomy, bilateral salpingo-oophorectomy.

 

SURGEON:  Meesook Kim, MD

 

ASSISTANT:  Dylan Daniels MD

 

ANESTHESIOLOGIST:  Fredis Miller MD

 

ANESTHESIA:  Spinal for the postop pain management and general.

 

ESTIMATED BLOOD LOSS:  Approximately 100 mL.

 

COUNTS:  Final sponge count and instrument count are correct.

 

PROCEDURE IN DETAILS:  Under proper induction of spinal anesthesia and then induction of general anes
thesia adequately, the patient was placed in the frog position.  Vaginal prep was done and Zapata cath
eter was introduced under sterile condition and repositioned to supine.  Abdominal wall was prepped a
nd draped in usual aseptic manner.  A transverse incision was made along the previous incisional scar
.  Scar tissue was excised.  Incision was carried down through the subcutaneous tissue to the anterio
r recti fascia which was incised transversely in length of the incision.  The fascial flap was create
d by blunt and sharp dissection of tendinous attachment upward and downward and 2 rectus muscles were
 split in the midline and peritoneal cavity was entered.  There were some filmy adhesion under the pa
rietal peritoneum which was  and the uterus was explored which felt to be approximately 16 w
eeks of gestational size and surface was extremely irregular and since size of the uterus does not al
low the retractor insertion, it was decided to bring the uterus out and do proceed the surgery.  At t
his point, the uterus was delivered out, but there were multiple adhesions on the back of the uterus,
 most likely where the previous myomectomy done and the bowel segment along with the fallopian tubes 
and ovary was stuck to the posterior aspect of the low uterine wall.  In order to exteriorize the [place]
andrés to start the procedure, this adhesion has to be lysed.  Since the bowel segment was included in b
undle of adhesion which was gradually  from the uterus by using the electric cauterization a
nd close to the uterus, so it does not damage the other adjacent organ by multiple lysis of adhesion 
from the back of the uterus done and successfully  all the bowel segment.  After the lysis o
f adhesion done, bowel was packed away from the operative field and the uterus was completely deliver
ed out and exteriorized and both cornua of the uterus was grasped with Pean forceps and the right rou
nd ligament was clamped and cut, ligated with #1 chromic catgut and then anterior leaf of the broad l
igament was incised inferomedially, thus bringing down the bladder flap and it was noticed there are 
multiple small fibroids even on the cervix.  After  bladder flap and the broad ligament was 
windowed and right fallopian tubes and ovaries which were stuck to the right lateral aspect of the po
sterior uterine wall which was included, the infundibulopelvic ligament was doubly clamped and doubly
 ligated with #1 chromic catgut.  Then uterine vessel was clamped and cut and transfixed with #1 
candace catgut after the further separation of bladder from the low segment of the uterus.  In the left s
beth also, the round ligament was clamped, cut and ligated with #1 chromic catgut and the anterior asp
ect of the broad ligament was incised inferomedially, thus bladder flap was created on the left side 
and the bladder was pushed down from the lower portion of the uterus.  Because of this, ovary and the
 fallopian tube was stuck to the posterior aspect of the uterus which was decided to be removed after
 the separation of the bladder flap, the broad ligament was windowed and the left infundibulopelvic l
igament was doubly clamped and cut and doubly ligated with #1 chromic catgut.  Then further dissectio
n of the bladder was done and the uterine vessel was clamped and cut, ligated with #1 chromic.  After
 both the uterine vessel and upper portion of cardinal ligament was clamped and cut and ligated with 
#1 chromic catgut, the suture was left long and decided to cut of the fundus of the uterus from the c
ervix.  ____ to save the cervix in which will be done.  After the fundus was removed and the center o
f the cervix was cauterized, upper portion of the cervix was closed with #1 chromic catgut in continu
ous manner.  Bleeder was controlled properly on the cervix and ____ area with the cauterization.  Vicki
andrés was size of 16 weeks gestational age and showing multiple intramural and subserosal tumors from 2
 to 3 cm to possibly approximately 6 cm in diameter.  After the fundus out of field, OC retractor was
 introduced.  Then the upper portion of cardinal ligament was clamped and cut and suture was left mikayla
ng and the center of the cervix was cauterized.  Endocervical canal was cauterized and the rest of th
e cervix was closed with #1 chromic catgut in continuous manner.  Bleeder was controlled properly and
 irrigation was done.  Posteriorly, serosa of the rectum was checked.  There was a denuded area, whic
h was covered with Surgicel and followed by Interceed.  Another piece of Interceed covered on the cer
vix.  All bleeder was controlled properly and the OC retractor was removed after lap sponge was remov
ed and sponge count was correct.  The parietal peritoneum was closed using 0 chromic catgut in contin
uous manner.  Muscle was closed with 0 chromic catgut in continuous manner.  Fascia was closed with #
1 Vicryl in continuous manner in 2 segments.  Subcutaneous tissue was irrigated with water.  This lay
er was approximated with a 2-0 plain in continuous manner after adequate hemostasis was secured.  Ski
n was closed with a 3-0 Monocryl in subcuticular manner.  Steri-Strip was applied.  A pressure dressi
ng was applied.  Estimated blood loss was approximately 100 mL.  The patient withstood the procedure 
and was sent to the recovery room in stable condition.  Urine output was approximately 200, which was
 clear.

 

 

Dictated By: MEESOOK KIM MD MK/TOM

DD:    03/07/2019 22:15:53

DT:    03/08/2019 01:10:06

Conf#: [id number]

DID#:  [id number]

## 2019-03-08 NOTE — PD.PPDC
OB/GYN Discharge Instruction


Diagnosis


    [id number]
Final Diagnosis:  Zukny4j
s/p subtotal hysterectomy and BSO








Condition


                [id number]
Patient Condition:  Fixfl9t
Stable








Diet


                [id number]
Diet:  Olnnk1n
Resume Regular Diet








Activity/Restrictions


               Kgrsq3Ox
Activity:      Nrody8v
May Shower


               Odypi5Gw
Restrictions:  Swzem9w
No Exercising


                                         Minimize Stair-climbing


                                         No Sexual Activity


                                         Nothing in the Vagina


                                         No Atkins


                                         No Tampons, douche








Wound/Drain Care Instructions


[id number]
Wound/Drain Care              Aztyn8f
Remove Steri Strips in 2


Instructions:                            weeks


                                         Wash with soap and water


                                         Keep clean and dry








Follow-up


Follow-up with Physician:  2, Week/Weeks





Return to clinic for


      Sydei2Bn
GYN Instructions:       Nffip5c
Fever greater than 101


                                         Chills


                                         Worsening abdominal pain


                                         Excessive Vaginal Bleeding


                                         More than 2 pads per hour


                                         Unable to tolerate diet


      Skngo1Dq
Surgical Instructions:  Tmxry6p
Incisional Drainage


                                         Incisional Redness














STEWART AVILA MD                 Mar 8, 2019 15:38